# Patient Record
(demographics unavailable — no encounter records)

---

## 2024-11-05 NOTE — HISTORY OF PRESENT ILLNESS
[Postpartum Follow Up] : postpartum follow up [Complications:___] : no complications [Delivery Date: ___] : on [unfilled] [] : delivered by vaginal delivery [Breastfeeding] : not currently nursing [None] : No associated symptoms are reported [Clean/Dry/Intact] : clean, dry and intact [___ wks] : is [unfilled] weeks in size [Mild] : mild vaginal bleeding [Normal] : the vagina was normal [de-identified] : VAGINAL DISCHARGE, LONG IUD STRINGS  [de-identified] : IUD strings shortened, [facility]